# Patient Record
Sex: MALE | Race: WHITE
[De-identification: names, ages, dates, MRNs, and addresses within clinical notes are randomized per-mention and may not be internally consistent; named-entity substitution may affect disease eponyms.]

---

## 2019-06-04 ENCOUNTER — HOSPITAL ENCOUNTER (EMERGENCY)
Dept: HOSPITAL 72 - EMR | Age: 34
Discharge: HOME | End: 2019-06-04
Payer: COMMERCIAL

## 2019-06-04 VITALS — DIASTOLIC BLOOD PRESSURE: 75 MMHG | SYSTOLIC BLOOD PRESSURE: 121 MMHG

## 2019-06-04 VITALS — WEIGHT: 220 LBS | BODY MASS INDEX: 29.8 KG/M2 | HEIGHT: 72 IN

## 2019-06-04 VITALS — DIASTOLIC BLOOD PRESSURE: 79 MMHG | SYSTOLIC BLOOD PRESSURE: 114 MMHG

## 2019-06-04 VITALS — SYSTOLIC BLOOD PRESSURE: 121 MMHG | DIASTOLIC BLOOD PRESSURE: 75 MMHG

## 2019-06-04 DIAGNOSIS — F12.90: ICD-10-CM

## 2019-06-04 DIAGNOSIS — K29.70: Primary | ICD-10-CM

## 2019-06-04 LAB
ADD MANUAL DIFF: YES
ALBUMIN SERPL-MCNC: 4.1 G/DL (ref 3.4–5)
ALBUMIN/GLOB SERPL: 1.2 {RATIO} (ref 1–2.7)
ALP SERPL-CCNC: 56 U/L (ref 46–116)
ALT SERPL-CCNC: 23 U/L (ref 12–78)
ANION GAP SERPL CALC-SCNC: 10 MMOL/L (ref 5–15)
APPEARANCE UR: CLEAR
APTT PPP: YELLOW S
AST SERPL-CCNC: 20 U/L (ref 15–37)
BILIRUB SERPL-MCNC: 0.7 MG/DL (ref 0.2–1)
BUN SERPL-MCNC: 20 MG/DL (ref 7–18)
CALCIUM SERPL-MCNC: 9.1 MG/DL (ref 8.5–10.1)
CHLORIDE SERPL-SCNC: 101 MMOL/L (ref 98–107)
CO2 SERPL-SCNC: 24 MMOL/L (ref 21–32)
CREAT SERPL-MCNC: 0.8 MG/DL (ref 0.55–1.3)
ERYTHROCYTE [DISTWIDTH] IN BLOOD BY AUTOMATED COUNT: 10.6 % (ref 11.6–14.8)
GLOBULIN SER-MCNC: 3.3 G/DL
GLUCOSE UR STRIP-MCNC: NEGATIVE MG/DL
HCT VFR BLD CALC: 45.5 % (ref 42–52)
HGB BLD-MCNC: 16.1 G/DL (ref 14.2–18)
KETONES UR QL STRIP: (no result)
LEUKOCYTE ESTERASE UR QL STRIP: (no result)
MCV RBC AUTO: 83 FL (ref 80–99)
NITRITE UR QL STRIP: NEGATIVE
PH UR STRIP: 7 [PH] (ref 4.5–8)
PLATELET # BLD: 226 K/UL (ref 150–450)
POTASSIUM SERPL-SCNC: 3.5 MMOL/L (ref 3.5–5.1)
PROT UR QL STRIP: (no result)
RBC # BLD AUTO: 5.46 M/UL (ref 4.7–6.1)
SODIUM SERPL-SCNC: 135 MMOL/L (ref 136–145)
SP GR UR STRIP: 1.01 (ref 1–1.03)
UROBILINOGEN UR-MCNC: 1 MG/DL (ref 0–1)
WBC # BLD AUTO: 11.6 K/UL (ref 4.8–10.8)

## 2019-06-04 PROCEDURE — 80053 COMPREHEN METABOLIC PANEL: CPT

## 2019-06-04 PROCEDURE — 96374 THER/PROPH/DIAG INJ IV PUSH: CPT

## 2019-06-04 PROCEDURE — 81003 URINALYSIS AUTO W/O SCOPE: CPT

## 2019-06-04 PROCEDURE — 83690 ASSAY OF LIPASE: CPT

## 2019-06-04 PROCEDURE — 85007 BL SMEAR W/DIFF WBC COUNT: CPT

## 2019-06-04 PROCEDURE — 99284 EMERGENCY DEPT VISIT MOD MDM: CPT

## 2019-06-04 PROCEDURE — 96361 HYDRATE IV INFUSION ADD-ON: CPT

## 2019-06-04 PROCEDURE — 36415 COLL VENOUS BLD VENIPUNCTURE: CPT

## 2019-06-04 PROCEDURE — 80307 DRUG TEST PRSMV CHEM ANLYZR: CPT

## 2019-06-04 PROCEDURE — 85025 COMPLETE CBC W/AUTO DIFF WBC: CPT

## 2019-06-04 NOTE — NUR
ER DISCHARGE NOTE:



Patient is cleared to be discharged per ERMD, pt is aox4, on room air, with 
stable vital signs. pt was given dc and prescription instructions, pt was able 
to verbalize understanding, pt id band and iv site removed without 
complications. pt is able to ambulate with steady gait. pt took all belongings.

## 2019-06-04 NOTE — EMERGENCY ROOM REPORT
History of Present Illness


General


Chief Complaint:  Abdominal Pain


Source:  Patient





Present Illness


HPI


This patient complains of nausea, vomiting and diarrhea that started this 

morning.  The patient does use marijuana regularly.  He denies fever or chills.

  He denies dysuria or hematuria.  He has no other complaints.


Allergies:  


Coded Allergies:  


     No Known Allergies (Unverified , 6/4/19)





Patient History


Past Medical History:  none


Social History:  Reports: drug use - THC; Denies: smoking, alcohol use


Reviewed Nursing Documentation:  PMH: Agreed; PSxH: Agreed





Nursing Documentation-PMH


Past Medical History:  No Stated History





Review of Systems


All Other Systems:  negative except mentioned in HPI





Physical Exam





Vital Signs








  Date Time  Temp Pulse Resp B/P (MAP) Pulse Ox O2 Delivery O2 Flow Rate FiO2


 


6/4/19 09:16 97.9 59 18 118/71 (87) 99 Room Air  








Sp02 EP Interpretation:  reviewed, normal


General Appearance:  no apparent distress, alert, GCS 15, non-toxic


Head:  normocephalic, atraumatic


Eyes:  bilateral eye normal inspection, bilateral eye PERRL


ENT:  hearing grossly normal, normal pharynx, no angioedema, normal voice


Neck:  full range of motion, supple/symm/no masses


Respiratory:  chest non-tender, lungs clear, normal breath sounds, no 

respiratory distress, no retraction, no accessory muscle use, speaking full 

sentences


Cardiovascular #1:  regular rate, rhythm, no edema


Gastrointestinal:  normal bowel sounds, soft, non-distended, no guarding, no 

rebound, tenderness - Diffusely TTP


Rectal:  deferred


Musculoskeletal:  back normal, gait/station normal, normal range of motion, non-

tender


Neurologic:  alert, oriented x3, responsive, motor strength/tone normal, 

sensory intact, speech normal


Psychiatric:  judgement/insight normal, memory normal, mood/affect normal, no 

suicidal/homicidal ideation


Skin:  normal color, no rash, warm/dry, well hydrated





Medical Decision Making


Diagnostic Impression:  


 Primary Impression:  


 Gastritis


ER Course


This patient has a clinical presentation consistent with gastritis and likely 

cannibas hyperemesis syndrome.  The location of the pain and history and 

physical examination is consistent with this.  I considered other concerning 

differentials, to include appendicitis, cholelithiasis, cholecystitis, 

pancreatitis, perforated viscus, aortic aneurysm, and pyelonephritis to name a 

few.  However, laboratory workup in combination with medical and surgical 

history and physical exam makes these unlikely at this time.  I did educate the 

patient on close return precautions and followup instructions.





Laboratory Tests








Test


  6/4/19


09:15


 


White Blood Count


  11.6 K/UL


(4.8-10.8)  H


 


Red Blood Count


  5.46 M/UL


(4.70-6.10)


 


Hemoglobin


  16.1 G/DL


(14.2-18.0)


 


Hematocrit


  45.5 %


(42.0-52.0)


 


Mean Corpuscular Volume 83 FL (80-99)  


 


Mean Corpuscular Hemoglobin


  29.4 PG


(27.0-31.0)


 


Mean Corpuscular Hemoglobin


Concent 35.3 G/DL


(32.0-36.0)


 


Red Cell Distribution Width


  10.6 %


(11.6-14.8)  L


 


Platelet Count


  226 K/UL


(150-450)


 


Mean Platelet Volume


  7.3 FL


(6.5-10.1)


 


Neutrophils (%) (Auto)


  % (45.0-75.0)


 


 


Lymphocytes (%) (Auto)


  % (20.0-45.0)


 


 


Monocytes (%) (Auto)  % (1.0-10.0)  


 


Eosinophils (%) (Auto)  % (0.0-3.0)  


 


Basophils (%) (Auto)  % (0.0-2.0)  


 


Differential Total Cells


Counted 100  


 


 


Neutrophils % (Manual) 92 % (45-75)  H


 


Lymphocytes % (Manual) 4 % (20-45)  L


 


Monocytes % (Manual) 4 % (1-10)  


 


Eosinophils % (Manual) 0 % (0-3)  


 


Basophils % (Manual) 0 % (0-2)  


 


Band Neutrophils 0 % (0-8)  


 


Platelet Estimate Adequate  


 


Platelet Morphology Normal  


 


Red Blood Cell Morphology Normal  


 


Urine Color Yellow  


 


Urine Appearance Clear  


 


Urine pH 7 (4.5-8.0)  


 


Urine Specific Gravity


  1.015


(1.005-1.035)


 


Urine Protein


  1+ (NEGATIVE)


H


 


Urine Glucose (UA)


  Negative


(NEGATIVE)


 


Urine Ketones


  4+ (NEGATIVE)


H


 


Urine Blood


  1+ (NEGATIVE)


H


 


Urine Nitrite


  Negative


(NEGATIVE)


 


Urine Bilirubin


  Negative


(NEGATIVE)


 


Urine Urobilinogen


  1 MG/DL


(0.0-1.0)  H


 


Urine Leukocyte Esterase


  1+ (NEGATIVE)


H


 


Urine RBC


  2-4 /HPF (0 -


0)  H


 


Urine WBC


  2-4 /HPF (0 -


0)


 


Urine Squamous Epithelial


Cells Occasional


/LPF


 


Urine Bacteria


  Occasional


/HPF (NONE)


 


Sodium Level


  135 MMOL/L


(136-145)  L


 


Potassium Level


  3.5 MMOL/L


(3.5-5.1)


 


Chloride Level


  101 MMOL/L


()


 


Carbon Dioxide Level


  24 MMOL/L


(21-32)


 


Anion Gap


  10 mmol/L


(5-15)


 


Blood Urea Nitrogen


  20 mg/dL


(7-18)  H


 


Creatinine


  0.8 MG/DL


(0.55-1.30)


 


Estimate Glomerular


Filtration Rate > 60 mL/min


(>60)


 


Glucose Level


  132 MG/DL


()  H


 


Calcium Level


  9.1 MG/DL


(8.5-10.1)


 


Total Bilirubin


  0.7 MG/DL


(0.2-1.0)


 


Aspartate Amino Transferase


(AST) 20 U/L (15-37)


 


 


Alanine Aminotransferase (ALT)


  23 U/L (12-78)


 


 


Alkaline Phosphatase


  56 U/L


()


 


Total Protein


  7.4 G/DL


(6.4-8.2)


 


Albumin


  4.1 G/DL


(3.4-5.0)


 


Globulin 3.3 g/dL  


 


Albumin/Globulin Ratio 1.2 (1.0-2.7)  


 


Lipase


  95 U/L


()


 


Urine Opiates Screen


  Negative


(NEGATIVE)


 


Urine Barbiturates Screen


  Negative


(NEGATIVE)


 


Phencyclidine (PCP) Screen


  Negative


(NEGATIVE)


 


Urine Amphetamines Screen


  Negative


(NEGATIVE)


 


Urine Benzodiazepines Screen


  Negative


(NEGATIVE)


 


Urine Cocaine Screen


  Negative


(NEGATIVE)


 


Urine Marijuana (THC) Screen


  Positive


(NEGATIVE)  H











Last Vital Signs








  Date Time  Temp Pulse Resp B/P (MAP) Pulse Ox O2 Delivery O2 Flow Rate FiO2


 


6/4/19 10:44 97.9 87 20 114/79 99 Room Air  








Status:  improved


Disposition:  HOME, SELF-CARE


Condition:  Improved


Scripts


No Active Prescriptions or Reported Meds


Referrals:  


NON PHYSICIAN (PCP)


Patient Instructions:  Abdominal Pain, Adult











Bhavna Bernal DO Jun 4, 2019 12:43

## 2019-06-04 NOTE — NUR
ED Nurse Note:



Pt came in from home due to lower abdominal pain with n/v since 0200 this 
morning. Emesis was undigested food. Last bowel movement was this morning. Pain 
5/10 throbbing. AOx4, VSS. Will cont to monitor.